# Patient Record
Sex: MALE | Race: WHITE | Employment: STUDENT | URBAN - METROPOLITAN AREA
[De-identification: names, ages, dates, MRNs, and addresses within clinical notes are randomized per-mention and may not be internally consistent; named-entity substitution may affect disease eponyms.]

---

## 2020-07-06 ENCOUNTER — HOSPITAL ENCOUNTER (OUTPATIENT)
Dept: PHYSICAL THERAPY | Age: 20
Discharge: HOME | End: 2020-07-06
Payer: OTHER MISCELLANEOUS

## 2020-07-06 PROCEDURE — 97110 THERAPEUTIC EXERCISES: CPT | Performed by: PHYSICAL THERAPIST

## 2020-07-06 PROCEDURE — 97161 PT EVAL LOW COMPLEX 20 MIN: CPT | Performed by: PHYSICAL THERAPIST

## 2020-07-06 PROCEDURE — 97140 MANUAL THERAPY 1/> REGIONS: CPT | Performed by: PHYSICAL THERAPIST

## 2020-07-06 NOTE — THERAPY EVALUATION
Kamryn Valdez 
: 2000 Primary: [employer]* Secondary:  [facility]rapy Center at [facility], [place] [address] [phone]   [phone] OUTPATIENT PHYSICAL THERAPY:Initial Assessment 2020 ICD-10: Treatment Diagnosis: (M54.5) LBP; (M62.81) muscle weakness Precautions/Allergies:  
Patient has no known allergies. TREATMENT PLAN: 
Effective Dates: 2020 TO 10/4/2020 (90 days). Frequency/Duration: 3 times a week for 90 Day(s) MEDICAL/REFERRING DIAGNOSIS: 
Strain of muscle, fascia and tendon of lower back, subsequent encounter [S39.012D] DATE OF ONSET: 20 REFERRING PHYSICIAN: Niya Orozco DO MD Orders: Evaluate and Treat for lumbar strain Return MD Appointment: 20 INITIAL ASSESSMENT:  Mr. David De La Rosa presents with L side mechanical LBP from a L anterior innominate rotation and L side lumbar rotational fault sustained from lifting a 45# tire overhead at work on 20. He reports pain of 6/10 today and scored a 24% disability rating on the modified oswestry. Pt will benefit from skilled PT to address the deficits listed below. PROBLEM LIST (Impacting functional limitations): 1. Decreased Strength 2. Increased Pain 3. Decreased Flexibility/Joint Mobility 4. Decreased Knowledge of Precautions 5. Decreased [place] with Home Exercise Program INTERVENTIONS PLANNED: (Treatment may consist of any combination of the following) 1. Cold 2. Electrical Stimulation 3. Heat 4. Home Exercise Program (HEP) 5. Manual Therapy 6. Neuromuscular Re-education/Strengthening 7. Therapeutic Exercise/Strengthening 8. Dry Needling 9. Kinesiotaping TREATMENT PLAN: 
GOALS: (Goals have been discussed and agreed upon with patient.) Short-Term Functional Goals: Time Frame: 4-6 weeks (20) 1.    Pt will be compliant and independent with HEP, log rolling and demonstrating correct, erect posture without an increase in pain. 2.   Pt will improve Oswestry score to <10/50 to increase pt's overall functional mobility. 3.   Pt to subjectively report a decrease in pain to 4-5/10 @ worst to increase pt's sitting, standing and walking tolerance. 4.   Pt will improve Lumbar AROM to 50% all directions to increase pt's overall functional mobility. 5.   Pt will maintain neutral pelvic and lumbar alignment ~ 50% of the time, indicating improved core and B hip strength. Discharge Goals: Time Frame: 8-12 weeks (10-4-20) 1. Pt will improve Oswestry score to <8/50 to increase pt's overall functional mobility. 2.   Pt will subjectively report a decrease in pain to 3/10 @ worst to allow pt to ambulate with a normal gait pattern and increase their overall functional mobility. 3.   Pt will subjectively report a decrease in frequency (1-2x/night) of waking due to back pain. 4.   Pt will maintain neutral pelvic and lumbar alignment ~ 75% of the time, indicating core and B hip strength of at least 3+/5 throughout. 5.   Pt will be discharged from PT to [facility]. OUTCOME MEASURE:  
Tool Used: Modified Oswestry Low Back Pain Questionnaire Score:  Initial: 12/50=24% disability  Most Recent: X/50 (Date: -- ) Interpretation of Score: Each section is scored on a 0-5 scale, 5 representing the greatest disability. The scores of each section are added together for a total score of 50. MEDICAL NECESSITY:  
· Patient is expected to demonstrate progress in strength and range of motion to improve pelvic and lumbar alignment while decreasing pain to increase pt's overal functional mobility. . 
 
Total Duration: 20 minutes initial assessment; see daily treatment note. PT Patient Time In/Time Out Time In: 1430 Time Out: 1530 Rehabilitation Potential For Stated Goals: Good Regarding Gisele Valdez's therapy, I certify that the treatment plan above will be carried out by a therapist or under their direction. Thank you for this referral, Di Marc, PT Referring Physician Signature: Fransisco Goodman DO _______________________________ Date _____________ PAIN/SUBJECTIVE:  
Initial: Pain Intensity 1: 6 Pain Location 1: Back Pain Orientation 1: Left  Post Session:  6/10 HISTORY:  
History of Injury/Illness (Reason for Referral): 
Pt reports he injured his back on 6-6-20 lifting a 45# tire overhead. He states pain with bending, twisting and lifting. He reports taking flexeril as needed for muscle spasms. He is still working light duty driving a forklift, but must turn his body most of a shift due to driving in reverse a lot of the day. Past Medical History/Comorbidities:  
Mr. Di Mustafa  has a past medical history of Acne (8/30/2016), ADHD (attention deficit hyperactivity disorder) (8/30/2016), Leg pain, bilateral (8/30/2016), Syncope (8/30/2016), and Viral syndrome (8/30/2016). Mr. Di Mustafa  has no past surgical history on file. Social History/Living Environment:  
  Pt lives in a 1 story home with family with a tub/shower combo. Prior Level of Function/Work/Activity: Pt works 40hs/week at [employer] on [employer] duty right now. Dominant Side:  
      [facility] H2 Model Falls Risk Assessment Risk Factors: 
     (1)  Gender [Male] Ability to Rise from Chair: 
     (0)  Ability to rise in a single movement Falls Prevention Plan: No modifications necessary Total: (5 or greater = High Risk): 1 ©2010 [facility] [address] States Patent #[id number]. Federal Law prohibits the replication, distribution or use without written permission from [facility] Current Medications:   
  
Current Outpatient Medications:  
  ketoconazole (NIZORAL) 2 % topical cream, APPLY TO AFFECTED AREA TWICE A DAY, Disp: , Rfl: 0 
   triamcinolone (ARISTOCORT) 0.5 % topical cream, Apply  to affected area two (2) times a day. use thin layer, Disp: 15 g, Rfl: 0 
  methylPREDNISolone (MEDROL DOSEPACK) 4 mg tablet, Take as directed on the pack, Disp: 1 Dose Pack, Rfl: 0 
  benzoyl peroxide-erythromycin (BENZAMYCIN) 3-5 % topical gel, Apply to the affected areas as directed two times daily. , Disp: 46.6 g, Rfl: 2 
  guanFACINE (TENEX) 1 mg tablet, Take 1 mg by mouth. 3 tablets daily, Disp: , Rfl:  
  cloNIDine HCl (CATAPRES) 0.1 mg tablet, Take 0.1 mg by mouth nightly., Disp: , Rfl:  
  methylphenidate ER 36 mg 24 hr tab, Take 36 mg by mouth daily. , Disp: , Rfl:  
  clindamycin (CLEOCIN) 300 mg capsule, Take 300 mg by mouth two (2) times a day., Disp: , Rfl:   
Date Last Reviewed:  7/6/2020 Number of Personal Factors/Comorbidities that affect the Plan of Care: 0: LOW COMPLEXITY EXAMINATION:  
Observation/Orthostatic Postural Assessment:   
      Pt stands with higher iliac crest on R compared to L. Palpation:   
      L anterior innominate rotation and R side lumbar rotational fault. Increased lumbar paraspinal tightness Posture/Deformity: 
Lumbar AROM: % of normal motion in standing Lumbar spine Date: 
7/6/20 Date: 
 Date: 
  
Direction  Parameters Parameters Parameters Flexion  25% w/ pain Extension  10% w/ pain Rotation  30% B w/ pain Side bending  30% B w/ pain Hip WFL B Strength Date: 
7/6/20 Date: 
 Date: 
  
Core/LE 3-/5 Parameters Parameters Hip Flex R:3+/5 L:3+/5 Hip Ext R:3/5 
L:3/5 Hip ABD R:3-/5 L:3-/5 Hip ADD R:3+/5 L:3+/5 Knee Ext R:4/5 
L:4/5    
Knee Flex R:3/5 
L:3/5 Myotomes: Normal and equal B throughout Lower Abdominals (L1): Iliopsoas (L2): 
Quadriceps (L3): Anterior Tibialis (L4): Extensor Hallicus Longus (L5): 
Gastrocnemius (S1): 
 
Sensation: Normal and equal B throughout Anterior Thigh (L3): 
Medial Foot (L4): 
Dorsal Foot (L5): 
Lateral Foot (S1): 
 Special Test/Function: 
Pelvic Obliquity: +; L anterior innominate rotation Rotational Fault: +; FRSL4-5 Accessory Mobility: normal 
SI Compression: negative Body Structures Involved: 1. Nerves 2. Bones 3. Joints 4. Muscles 5. Ligaments Body Functions Affected: 1. Sensory/Pain 2. Neuromusculoskeletal 
3. Movement Related Activities and Participation Affected: 1. Mobility 2. Work Number of elements (examined above) that affect the Plan of Care: 1-2: LOW COMPLEXITY CLINICAL PRESENTATION:  
Presentation: Stable and uncomplicated: LOW COMPLEXITY CLINICAL DECISION MAKING:  
Use of outcome tool(s) and clinical judgement create a POC that gives a: Clear prediction of patient's progress: LOW COMPLEXITY  
 
'

## 2020-07-06 NOTE — PROGRESS NOTES
Dontae Corcoran   : 2000  Primary: [employer]*  Secondary:  Therapy Center at [place] [address], [address], [place] [address]  [phone]   [phone]      OUTPATIENT PHYSICAL THERAPY: Daily Treatment Note 2020  ICD-10: Treatment Diagnosis: (M54.5) LBP; (M62.81) muscle weakness  Precautions/Allergies:   Patient has no known allergies. TREATMENT PLAN:  Effective Dates: 2020 TO 10/4/2020 (90 days). Frequency/Duration: 3 times a week for 90 Day(s)     MEDICAL/REFERRING DIAGNOSIS:  Strain of muscle, fascia and tendon of lower back, subsequent encounter [S39.012D]   DATE OF ONSET: 20  REFERRING PHYSICIAN: Donnette Apley, DO MD Orders: Evaluate and Treat for lumbar strain  Return MD Appointment: 20       Pre-treatment Symptoms/Complaints:  Pt c/o LBP w/ sit-stand transfers, as well as, bending, lifting and twisting. Pain: Initial: Pain Intensity 1: 6  Pain Location 1: Back  Pain Orientation 1: Left  Post Session:  5-6/10   Medications Last Reviewed:  2020    Updated Objective Findings:  See evaluation note from today   TREATMENT:   THERAPEUTIC EXERCISE: (25 minutes):  Exercises per grid below to improve mobility and strength. Required moderate visual and verbal cues to promote proper body alignment, promote proper body posture, promote proper body mechanics and promote proper body breathing techniques. Progressed resistance, range, repetitions and complexity of movement as indicated. Date:  20 Date:   Date:     Activity/Exercise Parameters Parameters Parameters   TAs 10 x 5sec     TAs w/ add sq 10 x 5sec     Bridging w/ add sq and TAs 10 x 5sec     Hip ABD RTB; 10x     LTR 10x B     SKTC 2 x 20sec B     DKTC 2 x 20sec      MANUAL THERAPY: (15 minutes): Joint mobilization and Soft tissue mobilization was utilized and necessary because of the patient's restricted joint motion and restricted motion of soft tissue.  Muscle Energy Technique (MET) (L HS/R quads x 8 reps x 2 rounds; LTR w/ OP to L x 20 reps x 2 rounds) utilized to normalize pelvic and lumbar alignment. Treatment/Session Summary:    · Response to Treatment:  Neutral pelvis and Lspine after MET. Pt did well with above exercises, therefore I added those to his HEP. Shantell Heard · Communication/Consultation:  HEP 2x daily, ice, NSAIDS, Log Rolling, Car transfers, avoid bending, lifting, twisting  · Equipment provided today: HEP w/instruction sheet, RTB and Ice  · Recommendations/Intent for next treatment session: Next visit will focus on advancements to more challenging core and B hip strengthening exercises after pelvic and lumbar assessment/correction.   · Variance to POC: None    Total Treatment Billable Duration:  40 minutes (25 min therex; 15 min manual)  PT Patient Time In/Time Out  Time In: 1430  Time Out: [address], PT    Future Appointments   Date Time Provider Janette [facility]   7/8/2020  4:00 PM Jaimee Bronson, PT [facility]   7/13/2020  3:45 PM Jaimee Bronson, PT [facility]   7/15/2020  4:00 PM Jaimee Bronson, PT [facility]   7/16/2020  4:45 PM Jaimee Bronson, PT [facility]   7/20/2020  4:00 PM Manjula Gregory, PT [facility]

## 2020-07-08 ENCOUNTER — HOSPITAL ENCOUNTER (OUTPATIENT)
Dept: PHYSICAL THERAPY | Age: 20
Discharge: HOME | End: 2020-07-08
Payer: OTHER MISCELLANEOUS

## 2020-07-08 PROCEDURE — 97110 THERAPEUTIC EXERCISES: CPT | Performed by: PHYSICAL THERAPIST

## 2020-07-08 PROCEDURE — 97140 MANUAL THERAPY 1/> REGIONS: CPT | Performed by: PHYSICAL THERAPIST
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
## 2020-07-13 ENCOUNTER — HOSPITAL ENCOUNTER (OUTPATIENT)
Dept: PHYSICAL THERAPY | Age: 20
Discharge: HOME | End: 2020-07-13
Payer: OTHER MISCELLANEOUS

## 2020-07-13 PROCEDURE — 97110 THERAPEUTIC EXERCISES: CPT | Performed by: PHYSICAL THERAPIST

## 2020-07-13 PROCEDURE — 97140 MANUAL THERAPY 1/> REGIONS: CPT | Performed by: PHYSICAL THERAPIST

## 2020-07-13 NOTE — PROGRESS NOTES
Joon Mcrae Royal  : 2000  Primary: [employer]*  Secondary:  [facility], [address], [place] [address]  [phone]   [phone]      OUTPATIENT PHYSICAL THERAPY: Daily Treatment Note 2020  ICD-10: Treatment Diagnosis: (M54.5) LBP; (M62.81) muscle weakness  Precautions/Allergies:   Patient has no known allergies. TREATMENT PLAN:  Effective Dates: 2020 TO 10/4/2020 (90 days). Frequency/Duration: 3 times a week for 90 Day(s)     MEDICAL/REFERRING DIAGNOSIS:  Strain of muscle, fascia and tendon of lower back, subsequent encounter [S39.012D]   DATE OF ONSET: 20  REFERRING PHYSICIAN: Dewayne Frey DO MD Orders: Evaluate and Treat for lumbar strain  Return MD Appointment: 20       Pre-treatment Symptoms/Complaints:  Patient reports back pain and soreness continues. Reports it worsens the longer he's at work. Pain: Initial: Pain Intensity 1: 8  Pain Location 1: Back  Pain Orientation 1: Left  Post Session:  7/10; fatigue   Medications Last Reviewed:  2020    Updated Objective Findings:  R anterior innominate rotation and R side lumbar rotational fault. TREATMENT:   THERAPEUTIC EXERCISE: (45 minutes):  Exercises per grid below to improve mobility and strength. Required moderate visual and verbal cues to promote proper body alignment, promote proper body posture, promote proper body mechanics and promote proper body breathing techniques. Progressed resistance, range, repetitions and complexity of movement as indicated.      Date:  20 Date:  20 Date:  20   Activity/Exercise Parameters Parameters Parameters   TAs 10 x 5sec     TAs w/ add sq 10 x 5sec 20 x 5sec 20 x 5sec   Bridging w/ add sq and TAs 10 x 5sec 20 x 5sec W/ heels on green ball; 20x   Hip ABD RTB; 10x GTB; 20x    LTR 10x B 20x B W/ green ball working EO/IO; 20x   SKTC 2 x 20sec B 10 x 10sec B 5 x 10sec B   DKTC 2 x 20sec  4 x 20sec    SLR  15x B 15x B   S/L clams  15x B GTB; 20x B   S/L Hip ABD  15x B GTB; 20x B   Ball Rolls w/ green ball   20x   Triple Threat w/ green ball   20x   Shuttle Press   4 bands; 50x                     Recumbent stepper  L2; 10min L2; 10min   MANUAL THERAPY: (10 minutes): Joint mobilization and Soft tissue mobilization was utilized and necessary because of the patient's restricted joint motion and restricted motion of soft tissue. Muscle Energy Technique (MET) (L HS/R quads x 8 reps x 2 rounds; LTR w/ OP to L x 20 reps x 3 rounds) utilized to normalize pelvic and lumbar alignment. STM/MFR to posterior L hip w/ the stick to aide with improving tissue mobility. Treatment/Session Summary:    · Response to Treatment:  Only mild correction required. Pt did well with advancement of core and B hip strengthening exercises. Pt fatigued at end of treatment, but denied an increase in pain. .  · Communication/Consultation:  HEP 2x daily, ice, NSAIDS, Log Rolling, Car transfers, avoid bending, lifting, twisting  · Equipment provided today: Ice  · Recommendations/Intent for next treatment session: Next visit will focus on advancements to more challenging core and B hip strengthening exercises after pelvic and lumbar assessment/correction.   · Variance to POC: None    Total Treatment Billable Duration:  55 minutes ( 45 min therex; 10 min manual)  PT Patient Time In/Time Out  Time In: 1545  Time Out: JANES Lares    Future Appointments   Date Time Provider Janette Carvalho   7/15/2020  4:00 PM Pierce Monday, PT [facility]   7/16/2020  4:45 PM Pierce Monday, PT [facility]   7/20/2020  4:00 PM Felice Gregory, PT [facility]

## 2020-07-16 ENCOUNTER — HOSPITAL ENCOUNTER (OUTPATIENT)
Dept: PHYSICAL THERAPY | Age: 20
Discharge: HOME | End: 2020-07-16
Payer: OTHER MISCELLANEOUS

## 2020-07-16 PROCEDURE — 97110 THERAPEUTIC EXERCISES: CPT | Performed by: PHYSICAL THERAPIST

## 2020-07-16 PROCEDURE — 97140 MANUAL THERAPY 1/> REGIONS: CPT | Performed by: PHYSICAL THERAPIST

## 2020-07-16 NOTE — PROGRESS NOTES
Pat Valdez  : 2000  Primary: [employer]*  Secondary:  [facility], [address], [place] [address]  [phone]   [phone]      OUTPATIENT PHYSICAL THERAPY: Daily Treatment Note 2020  ICD-10: Treatment Diagnosis: (M54.5) LBP; (M62.81) muscle weakness  Precautions/Allergies:   Patient has no known allergies. TREATMENT PLAN:  Effective Dates: 2020 TO 10/4/2020 (90 days). Frequency/Duration: 3 times a week for 90 Day(s)     MEDICAL/REFERRING DIAGNOSIS:  Strain of muscle, fascia and tendon of lower back, subsequent encounter [S39.012D]   DATE OF ONSET: 20  REFERRING PHYSICIAN: Mukund Farnsworth DO MD Orders: Evaluate and Treat for lumbar strain  Return MD Appointment: 20       Pre-treatment Symptoms/Complaints:  Patient reports \"pounding\" pain in central Lspine today. He reports more thoracic pain when lying on his stomach watching tv. Pain: Initial: Pain Intensity 1: 8  Pain Location 1: Back  Pain Orientation 1: Left  Post Session:  7/10; fatigue/soreness   Medications Last Reviewed:  2020    Updated Objective Findings:  L anterior innominate rotation and L side lumbar rotational fault. TREATMENT:   THERAPEUTIC EXERCISE: (45 minutes):  Exercises per grid below to improve mobility and strength. Required moderate visual and verbal cues to promote proper body alignment, promote proper body posture, promote proper body mechanics and promote proper body breathing techniques. Progressed resistance, range, repetitions and complexity of movement as indicated.      Date:  20 Date:  20 Date:  20 Date:  20   Activity/Exercise Parameters Parameters Parameters    TAs 10 x 5sec      TAs w/ add sq 10 x 5sec 20 x 5sec 20 x 5sec 20 x 5sec   Bridging w/ add sq and TAs 10 x 5sec 20 x 5sec W/ heels on green ball; 20x W/ heel on green ball; 20x   Hip ABD RTB; 10x GTB; 20x     LTR 10x B 20x B W/ green ball working EO/IO; 20x W/ legs extended over green ball; 20x B   SKTC 2 x 20sec B 10 x 10sec B 5 x 10sec B    DKTC 2 x 20sec  4 x 20sec  5 x 20sec   SLR  15x B 15x B 20x B   S/L clams  15x B GTB; 20x B GTB; 20x B   S/L Hip ABD  15x B GTB; 20x B GTB; 20x B   Ball Rolls w/ green ball   20x 20x   Triple Threat w/ green ball   20x 20x   Shuttle Press   4 bands; 50x 5 bands; 30x   Open Book    20x B                 Recumbent stepper  L2; 10min L2; 10min L3; 10min   MANUAL THERAPY: (10 minutes): Joint mobilization and Soft tissue mobilization was utilized and necessary because of the patient's restricted joint motion and restricted motion of soft tissue. Muscle Energy Technique (MET) (L HS/R quads x 8 reps x 2 rounds; LTR w/ OP to L x 20 reps x 3 rounds) utilized to normalize pelvic and lumbar alignment. STM/MFR to B thoracolumbar paraspinals and B posterior hips, both manually and w/ the stick after grade III central PAs to thoracic and Lspine to aide with improving joint/tissue mobility. Treatment/Session Summary:    · Response to Treatment:  More advanced correction required today. Increased L side thoracolumbar paraspinal tightness and hypertrophy could be contributing to imbalances. Patient did well with advancements of his HEP. Candie Hind · Communication/Consultation:  HEP 2x daily, ice, NSAIDS, Log Rolling, Car transfers, avoid bending, lifting, twisting  · Equipment provided today: Ice  · Recommendations/Intent for next treatment session: Next visit will focus on advancements to more challenging core and B hip strengthening exercises after pelvic and lumbar assessment/correction. MD f/u next week.    · Variance to POC: None    Total Treatment Billable Duration:  55 minutes ( 45 min therex; 10 min manual)  PT Patient Time In/Time Out  Time In: 1645  Time Out: [address], PT    Future Appointments   Date Time Provider Janette Carvalho   7/20/2020  4:00 PM Tia Cool, PT [facility]

## 2020-07-22 ENCOUNTER — HOSPITAL ENCOUNTER (OUTPATIENT)
Dept: PHYSICAL THERAPY | Age: 20
Discharge: HOME | End: 2020-07-22
Payer: OTHER MISCELLANEOUS

## 2020-07-22 PROCEDURE — 97110 THERAPEUTIC EXERCISES: CPT | Performed by: PHYSICAL THERAPIST

## 2020-07-22 NOTE — PROGRESS NOTES
Mary Butterfield   : 2000  Primary: [employer]  Secondary:  [facility] [place] [address], [address], [place]  [phone]   [phone]      OUTPATIENT PHYSICAL THERAPY: Daily Treatment Note 2020  ICD-10: Treatment Diagnosis: (M54.5) LBP; (M62.81) muscle weakness  Precautions/Allergies:   Patient has no known allergies. TREATMENT PLAN:  Effective Dates: 2020 TO 10/4/2020 (90 days). Frequency/Duration: 3 times a week for 90 Day(s)     MEDICAL/REFERRING DIAGNOSIS:  Strain of muscle, fascia and tendon of lower back, subsequent encounter [S39.012D]   DATE OF ONSET: 20  REFERRING PHYSICIAN: Devorah Gonzalez DO MD Orders: Evaluate and Treat for lumbar strain  Return MD Appointment: 20       Pre-treatment Symptoms/Complaints:  Patient reports his core is stronger, but his injury \"isn't healing. \"  Pain: Initial: Pain Intensity 1: 7  Pain Location 1: Back  Pain Orientation 1: Left  Post Session:  7/10; fatigue/soreness   Medications Last Reviewed:  2020    Updated Objective Findings:  Neutral pelvis; mild L lumbar rotational fault. TREATMENT:   THERAPEUTIC EXERCISE: (55 minutes):  Exercises per grid below to improve mobility and strength. Required moderate visual and verbal cues to promote proper body alignment, promote proper body posture, promote proper body mechanics and promote proper body breathing techniques. Progressed resistance, range, repetitions and complexity of movement as indicated.      Date:  20 Date:  20 Date:  20 Date:  20 Date:  20   Activity/Exercise Parameters Parameters Parameters     TAs 10 x 5sec       TAs w/ add sq 10 x 5sec 20 x 5sec 20 x 5sec 20 x 5sec 20x 5 sec   Bridging w/ add sq and TAs 10 x 5sec 20 x 5sec W/ heels on green ball; 20x W/ heel on green ball; 20x SL; 20x B   Hip ABD RTB; 10x GTB; 20x      LTR 10x B 20x B W/ green ball working EO/IO; 20x W/ legs extended over green ball; 20x B 20x B   SKTC 2 x 20sec B 10 x 10sec B 5 x 10sec B     DKTC 2 x 20sec  4 x 20sec  5 x 20sec 5 x 20sec   SLR  15x B 15x B 20x B    S/L clams  15x B GTB; 20x B GTB; 20x B    S/L Hip ABD  15x B GTB; 20x B GTB; 20x B    Ball Rolls w/ green ball   20x 20x    Triple Threat w/ green ball   20x 20x    Shuttle Press   4 bands; 50x 5 bands; 30x 5 bands; 30x B LE  4 bands; 20x each; SL   Open Book    20x B 20x B   Lat Pull Down     BTB; 30x   Stdg core rotation w/ B UE extended     BTB; 30x B   BOSU squats w/ OH lift     6#; 15x   LTR w/OP to L     20x           Recumbent stepper  L2; 10min L2; 10min L3; 10min L3; 10min   MANUAL THERAPY: (0 minutes): Joint mobilization and Soft tissue mobilization was utilized and necessary because of the patient's restricted joint motion and restricted motion of soft tissue. Muscle Energy Technique (MET) (L HS/R quads x 8 reps x 2 rounds; LTR w/ OP to L x 20 reps x 3 rounds) utilized to normalize pelvic and lumbar alignment. STM/MFR to B thoracolumbar paraspinals and B posterior hips, both manually and w/ the stick after grade III central PAs to thoracic and Lspine to aide with improving joint/tissue mobility. Treatment/Session Summary:    · Response to Treatment:  Mild correction required at Lumbar spine. Patient did well with advanced core strengthening with no increased c/o pain. .  · Communication/Consultation:  HEP 2x daily, ice, NSAIDS, Log Rolling, Car transfers, avoid bending, lifting, twisting  · Equipment provided today: Ice  · Recommendations/Intent for next treatment session: Next visit will focus on advancements to more challenging core and B hip strengthening exercises after pelvic and lumbar assessment/correction. MD f/u tomorrow. · Variance to POC: None    Total Treatment Billable Duration: 55 minutes therex  PT Patient Time In/Time Out  Time In: 1650  Time Out: 375 [facility], PT    No future appointments.

## 2020-08-26 NOTE — THERAPY DISCHARGE
Zelda Valdez 
: 2000 Primary: [employer] Secondary:  [facility], [address], [place] [address] [phone]   [phone] OUTPATIENT PHYSICAL THERAPY:Discontinuation Summary 2020 ICD-10: Treatment Diagnosis: (M54.5) LBP; (M62.81) muscle weakness Precautions/Allergies:  
Patient has no known allergies. TREATMENT PLAN: 
Effective Dates: 2020 TO 10/4/2020 (90 days). Frequency/Duration: 3 times a week for 90 Day(s) MEDICAL/REFERRING DIAGNOSIS: 
Strain of muscle, fascia and tendon of lower back, subsequent encounter [S39.012D] DATE OF ONSET: 20 REFERRING PHYSICIAN: Luke Orozco DO MD Orders: Evaluate and Treat for lumbar strain Return MD Appointment: 20 Navya Gallardo has been seen in physical therapy from 20 to 20 for 5 visits. Treatment has been discontinued at this time due to patient failing to return for additional treatment. The below goals were met prior to discontinuation. Some goals were not met due to patient discontinuing treatment early. Patient is DC'd from PT to his HEP at this time. Thank you for this referral.    
  
PROBLEM LIST (Impacting functional limitations): 1. Decreased Strength 2. Increased Pain 3. Decreased Flexibility/Joint Mobility 4. Decreased Knowledge of Precautions 5. Decreased [place] with Home Exercise Program INTERVENTIONS PLANNED: (Treatment may consist of any combination of the following) 1. Cold 2. Electrical Stimulation 3. Heat 4. Home Exercise Program (HEP) 5. Manual Therapy 6. Neuromuscular Re-education/Strengthening 7. Therapeutic Exercise/Strengthening 8. Dry Needling 9. Kinesiotaping TREATMENT PLAN: 
GOALS: (Goals have been discussed and agreed upon with patient.) Short-Term Functional Goals: Time Frame: 4-6 weeks (20) 1.    Pt will be compliant and independent with HEP, log rolling and demonstrating correct, erect posture without an increase in pain.--------------------------MET 2. Pt will improve Oswestry score to <10/50 to increase pt's overall functional mobility.-----------------------------------------------------------------NOT ASSESSED 3. Pt to subjectively report a decrease in pain to 4-5/10 @ worst to increase pt's sitting, standing and walking tolerance. ------------------------------NOT MET 4. Pt will improve Lumbar AROM to 50% all directions to increase pt's overall functional mobility. --------------------------------------------------------------------MET 5. Pt will maintain neutral pelvic and lumbar alignment ~ 50% of the time, indicating improved core and B hip strength. ----------------------------------------MET Discharge Goals: Time Frame: 8-12 weeks (10-4-20) 1. Pt will improve Oswestry score to <8/50 to increase pt's overall functional mobility.-----------------------------------------------------------------------------NOT ASSESSED 2. Pt will subjectively report a decrease in pain to 3/10 @ worst to allow pt to ambulate with a normal gait pattern and increase their overall functional mobility. ---MET 3. Pt will subjectively report a decrease in frequency (1-2x/night) of waking due to back pain.------------------------------------------------------------------------------NOT MET 4. Pt will maintain neutral pelvic and lumbar alignment ~ 75% of the time, indicating core and B hip strength of at least 3+/5 throughout. -----------------------NOT MET 5.    Pt will be discharged from PT to HEP.---------------------------------------------------------------------------------------------------------------------------------------------------MET 
 
  
 
 
'